# Patient Record
Sex: MALE
[De-identification: names, ages, dates, MRNs, and addresses within clinical notes are randomized per-mention and may not be internally consistent; named-entity substitution may affect disease eponyms.]

---

## 2022-08-15 PROBLEM — Z00.00 ENCOUNTER FOR PREVENTIVE HEALTH EXAMINATION: Status: ACTIVE | Noted: 2022-08-15

## 2022-08-16 ENCOUNTER — APPOINTMENT (OUTPATIENT)
Dept: PEDIATRIC ORTHOPEDIC SURGERY | Facility: CLINIC | Age: 67
End: 2022-08-16

## 2022-08-16 VITALS — BODY MASS INDEX: 25.2 KG/M2 | HEIGHT: 71 IN | WEIGHT: 180 LBS

## 2022-08-16 DIAGNOSIS — S80.01XA CONTUSION OF RIGHT KNEE, INITIAL ENCOUNTER: ICD-10-CM

## 2022-08-16 DIAGNOSIS — S63.642A SPRAIN OF METACARPOPHALANGEAL JOINT OF LEFT THUMB, INITIAL ENCOUNTER: ICD-10-CM

## 2022-08-16 DIAGNOSIS — S80.11XA CONTUSION OF RIGHT KNEE, INITIAL ENCOUNTER: ICD-10-CM

## 2022-08-16 PROCEDURE — 99213 OFFICE O/P EST LOW 20 MIN: CPT

## 2022-08-16 PROCEDURE — 73140 X-RAY EXAM OF FINGER(S): CPT

## 2022-08-16 PROCEDURE — 73562 X-RAY EXAM OF KNEE 3: CPT

## 2022-08-17 PROBLEM — S80.01XA CONTUSION OF KNEE AND LOWER LEG, RIGHT, INITIAL ENCOUNTER: Status: ACTIVE | Noted: 2022-08-16

## 2022-08-22 NOTE — ASSESSMENT
[FreeTextEntry1] : Impression: Sprain MP joint left thumb, contusion right knee.\par \par He will be treated symptomatically with rest and over-the-counter medications and will return on a as needed basis

## 2022-08-22 NOTE — PHYSICAL EXAM
[de-identified] : His exam today reveals minimal limp on the right side.  He has good motion to the right hip and knee he has a clean abrasion along the anterior aspect of the knee with no evidence of infection.  The knee has a small effusion with no evidence of instability on stress to either cruciate/collateral ligaments.  He has obvious pain about the abrasion site which is more so laterally.  Popliteal fossa calf neurovascular exam are negative.\par \par With regards to his left thumb minimal swelling is noted he has reasonable motion in all planes including circumduction he is tender about the MP joint of there is no evidence of instability on stress of the collateral ligaments.  No hyperextension instability noted as well.  X-rays of the left thumb and right knee ordered and taken today reveal no evidence of acute fracture

## 2022-08-22 NOTE — HISTORY OF PRESENT ILLNESS
[de-identified] : This 67-year-old gentleman is seen for evaluation of his left thumb and right knee.  He was well until 5 days ago when while at a [employer] fair in [facility] he tripped over the curb of an island which was not visible causing him to fall directly onto his right knee and injuring his left thumb.  He has had swelling of the knee with mild restricted motion along with an abrasion along the front of the knee.  He has had a mild limp which has been slowly improving.  No locking buckling or sensation of instability.  He does have a history of arthritis involving his knee for which in the past he had been treated with steroid injection.  With regards to his left thumb he has noted pain on motion with minimal swelling no triggering or obvious deformity.  His general health includes hypertension and increased lipids as well as systemic arthritis.  He is on multiple medication as listed on the chart.